# Patient Record
Sex: MALE | Race: BLACK OR AFRICAN AMERICAN | NOT HISPANIC OR LATINO | Employment: FULL TIME | URBAN - METROPOLITAN AREA
[De-identification: names, ages, dates, MRNs, and addresses within clinical notes are randomized per-mention and may not be internally consistent; named-entity substitution may affect disease eponyms.]

---

## 2018-08-25 ENCOUNTER — APPOINTMENT (OUTPATIENT)
Dept: GENERAL RADIOLOGY | Facility: CLINIC | Age: 58
End: 2018-08-25
Attending: EMERGENCY MEDICINE
Payer: OTHER MISCELLANEOUS

## 2018-08-25 ENCOUNTER — HOSPITAL ENCOUNTER (EMERGENCY)
Facility: CLINIC | Age: 58
Discharge: HOME | End: 2018-08-25
Attending: EMERGENCY MEDICINE | Admitting: EMERGENCY MEDICINE
Payer: OTHER MISCELLANEOUS

## 2018-08-25 VITALS
RESPIRATION RATE: 16 BRPM | WEIGHT: 206 LBS | BODY MASS INDEX: 31.32 KG/M2 | OXYGEN SATURATION: 100 % | SYSTOLIC BLOOD PRESSURE: 175 MMHG | DIASTOLIC BLOOD PRESSURE: 105 MMHG | TEMPERATURE: 98 F | HEART RATE: 93 BPM

## 2018-08-25 DIAGNOSIS — S43.402A SPRAIN OF LEFT SHOULDER, UNSPECIFIED SHOULDER SPRAIN TYPE, INITIAL ENCOUNTER: ICD-10-CM

## 2018-08-25 LAB — TROPONIN I BLD-MCNC: 0 UG/L (ref 0–0.1)

## 2018-08-25 PROCEDURE — 84484 ASSAY OF TROPONIN QUANT: CPT

## 2018-08-25 PROCEDURE — 99285 EMERGENCY DEPT VISIT HI MDM: CPT | Mod: 25 | Performed by: EMERGENCY MEDICINE

## 2018-08-25 PROCEDURE — 71046 X-RAY EXAM CHEST 2 VIEWS: CPT

## 2018-08-25 PROCEDURE — 93005 ELECTROCARDIOGRAM TRACING: CPT | Performed by: EMERGENCY MEDICINE

## 2018-08-25 PROCEDURE — 99283 EMERGENCY DEPT VISIT LOW MDM: CPT | Mod: Z6 | Performed by: EMERGENCY MEDICINE

## 2018-08-25 PROCEDURE — 73030 X-RAY EXAM OF SHOULDER: CPT | Mod: LT

## 2018-08-25 NOTE — ED PROVIDER NOTES
[facility] EMERGENCY DEPARTMENT ([facility])     August 25, 2018    History     Chief Complaint   Patient presents with     Shoulder Injury     HPI  Michael Rod is a 58 year old male with history of CAD (s/p stent placement) and HTN who presents to the ED with left shoulder injury.  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  He states that around 11:30 AM this morning he was moving a couch and felt he pulled his muscle in his left shoulder.  He denies hearing or feeling anything snapping.  He states he did not initially have any pain, but throughout the day the pain was gradual in onset.  He states the pain is made worse with movement and has not taken any analgesics yet for his pain.  He states that he can only take aspirin for his pain.  He otherwise denies any other injury.    PAST MEDICAL HISTORY  Past Medical History:   Diagnosis Date     CAD (coronary artery disease)      Hypertension      Tobacco use      PAST SURGICAL HISTORY  Past Surgical History:   Procedure Laterality Date     CARDIAC SURGERY      History of chest pain     ORTHOPEDIC SURGERY  Feb 2012    L hand trigger finger     FAMILY HISTORY  No family history on file.  SOCIAL HISTORY  Social History   Substance Use Topics     Smoking status: Current Every Day Smoker     Smokeless tobacco: Former User     Alcohol use Yes      Comment: occasional     MEDICATIONS  No current facility-administered medications for this encounter.      Current Outpatient Prescriptions   Medication     aspirin EC 81 MG EC tablet     atorvastatin (LIPITOR) 40 MG tablet     metoprolol (LOPRESSOR) 25 MG tablet     nitroglycerin (NITROSTAT) 0.4 MG SL tablet     ALLERGIES  No Known Allergies    I have reviewed the Medications, Allergies, Past Medical and Surgical History, and Social History in the Epic system.    Review of Systems   Musculoskeletal:        Positive for L shoulder pain.    All other systems reviewed and are negative.      Physical Exam   BP:  165/85  Pulse: 68  Temp: 98.2  F (36.8  C)  Resp: 18  Weight: 93.4 kg (206 lb)  SpO2: 97 %      Physical Exam   Constitutional: He is oriented to person, place, and time. No distress.   HENT:   Head: Atraumatic.   Mouth/Throat: Oropharynx is clear and moist. No oropharyngeal exudate.   Eyes: Pupils are equal, round, and reactive to light. No scleral icterus.   Cardiovascular: Normal heart sounds and intact distal pulses.    Pulmonary/Chest: Breath sounds normal. No respiratory distress.   Abdominal: Soft. Bowel sounds are normal. There is no tenderness.   Musculoskeletal: He exhibits tenderness. He exhibits no edema.   L shoulder tender   Neurological: He is alert and oriented to person, place, and time.   Skin: Skin is warm. No rash noted. He is not diaphoretic.   Nursing note and vitals reviewed.      ED Course     ED Course     Procedures   4:18 PM  The patient was seen and examined by Dr. Diane in Room 20.                Critical Care time:  none             Labs Ordered and Resulted from Time of ED Arrival Up to the Time of Departure from the ED - No data to display         Assessments & Plan (with Medical Decision Making)     In summary this is a 58-year-old gentleman with history of CAD with stent in place who comes in with complaint of left shoulder pain while doing some heavy lifting on the job earlier in the day.  His presentations concerning for a musculoskeletal injury to the left shoulder.  Given his cardiac history I went ahead and get an EKG which revealed normal sinus rhythm and no ischemic changes and a point-of-care troponin which is normal x-rays of the left shoulder and chest were also obtained and these revealed no acute process.  Patient to follow-up with employee health within 1-2 days with light duty restrictions until cleared by them or orthopedic's.    I have reviewed the nursing notes.    I have reviewed the findings, diagnosis, plan and need for follow up with the patient.    New  Prescriptions    No medications on file       Final diagnoses:   Sprain of left shoulder, unspecified shoulder sprain type, initial encounter     IArtemio, am serving as a trained medical scribe to document services personally performed by Ulises Diane MD, based on the provider's statements to me.      Ulises MEDRANO MD, was physically present and have reviewed and verified the accuracy of this note documented by Artemio Alvarado.       8/25/2018   [facility], [place], EMERGENCY DEPARTMENT     Ulises Diane MD  08/31/18 0227

## 2018-08-25 NOTE — DISCHARGE INSTRUCTIONS
Please make an appointment to follow up with [facility] (phone: [phone]) in 1-2 days.    Shoulder Sprain  A sprain is a stretching or tearing of the ligaments that hold a joint together. A sprain may take up to 8 weeks to fully heal, depending on how severe it is. Moderate to severe shoulder sprains are treated with a sling or shoulder immobilizer. Minor sprains can be treated without any special support.  Home care  The following guidelines will help you care for your injury at home:    If a sling was given to you, leave it in place for the time advised by your healthcare provider. If you aren t sure how long to wear it, ask for advice. If the sling becomes loose, adjust it so that your forearm is level with the ground. Your shoulder should feel well supported.    Put an ice pack on the injured area for 20 minutes every 1 to 2 hours the first day. You can make your own ice pack by putting ice cubes in a plastic bag. A bag of frozen peas or something similar works well too. Wrap the bag in a thin towel. Continue with ice packs 3 to 4 times a day for the next 2 to 3 days. Then use the pack as needed to ease pain and swelling.    You may use acetaminophen or ibuprofen to control pain, unless another pain medicine was prescribed. If you have chronic liver or kidney disease, talk with your healthcare provider before using these medicines. Also talk with your provider if you ve had a stomach ulcer or gastrointestinal bleeding.    Shoulder joints become stiff if left in a sling for too long. You should start range of motion exercises about 7 to 10 days after the injury. Talk with your provider to find out what type of exercises to do and how soon to start.  Follow-up care  Follow up with your healthcare provider, or as advised.  Any X-rays you had today don t show any broken bones, breaks, or fractures. Sometimes fractures don t show up on the first X-ray. Bruises and sprains can sometimes hurt as  much as a fracture. These injuries can take time to heal completely. If your symptoms don t improve or they get worse, talk with your provider. You may need a repeat X-ray or other treatments.  When to seek medical advice  Call your healthcare provider right away if any of these occur:    Shoulder pain or swelling in your arm that gets worse    Fingers become cold, blue, numb, or tingly    Large amount of bruising of the shoulder or upper arm    Fever or chills  Date Last Reviewed: 8/1/2016 2000-2017 [employer]. [address] [place] [address]. All rights reserved. This information is not intended as a substitute for professional medical care. Always follow your healthcare professional's instructions.

## 2018-08-25 NOTE — ED AVS SNAPSHOT
[facility], Emergency Department    [address]    [place] [address]    Phone:  [phone]    Fax:  [phone]                                       Michael Rod   MRN: [id number]    Department:  [facility], Emergency Department   Date of Visit:  8/25/2018           Patient Information     Date Of Birth          1960        Your diagnoses for this visit were:     Sprain of left shoulder, unspecified shoulder sprain type, initial encounter        You were seen by Ulises Diane MD.        Discharge Instructions         Please make an appointment to follow up with [facility] (phone: [phone]) in 1-2 days.    Shoulder Sprain  A sprain is a stretching or tearing of the ligaments that hold a joint together. A sprain may take up to 8 weeks to fully heal, depending on how severe it is. Moderate to severe shoulder sprains are treated with a sling or shoulder immobilizer. Minor sprains can be treated without any special support.  Home care  The following guidelines will help you care for your injury at home:    If a sling was given to you, leave it in place for the time advised by your healthcare provider. If you aren t sure how long to wear it, ask for advice. If the sling becomes loose, adjust it so that your forearm is level with the ground. Your shoulder should feel well supported.    Put an ice pack on the injured area for 20 minutes every 1 to 2 hours the first day. You can make your own ice pack by putting ice cubes in a plastic bag. A bag of frozen peas or something similar works well too. Wrap the bag in a thin towel. Continue with ice packs 3 to 4 times a day for the next 2 to 3 days. Then use the pack as needed to ease pain and swelling.    You may use acetaminophen or ibuprofen to control pain, unless another pain medicine was prescribed. If you have chronic liver or kidney disease, talk with your healthcare provider before using these medicines. Also talk with  your provider if you ve had a stomach ulcer or gastrointestinal bleeding.    Shoulder joints become stiff if left in a sling for too long. You should start range of motion exercises about 7 to 10 days after the injury. Talk with your provider to find out what type of exercises to do and how soon to start.  Follow-up care  Follow up with your healthcare provider, or as advised.  Any X-rays you had today don t show any broken bones, breaks, or fractures. Sometimes fractures don t show up on the first X-ray. Bruises and sprains can sometimes hurt as much as a fracture. These injuries can take time to heal completely. If your symptoms don t improve or they get worse, talk with your provider. You may need a repeat X-ray or other treatments.  When to seek medical advice  Call your healthcare provider right away if any of these occur:    Shoulder pain or swelling in your arm that gets worse    Fingers become cold, blue, numb, or tingly    Large amount of bruising of the shoulder or upper arm    Fever or chills  Date Last Reviewed: 8/1/2016 2000-2017 [employer]. [address] [place] [address]. All rights reserved. This information is not intended as a substitute for professional medical care. Always follow your healthcare professional's instructions.          24 Hour Appointment Hotline       To make an appointment at any [facility], call [phone] ([phone]). If you don't have a family doctor or clinic, we will help you find one. [facility] are conveniently located to serve the needs of you and your family.          ED Discharge Orders     Follow up with [facility]       You should receive a call from [facility] to schedule your follow up appointment. If you do not hear from them within 24 business hours, call [phone], option 3 for help in scheduling your follow up.  If you are seen in the Emergency Department over the weekend, you will  receive a phone call on the next business day.                     Review of your medicines      Our records show that you are taking the medicines listed below. If these are incorrect, please call your family doctor or clinic.        Dose / Directions Last dose taken    aspirin 81 MG EC tablet   Dose:  81 mg   Quantity:  30 tablet        Take 1 tablet (81 mg) by mouth daily   Refills:  3        atorvastatin 40 MG tablet   Commonly known as:  LIPITOR   Dose:  40 mg   Quantity:  90 tablet        Take 1 tablet (40 mg) by mouth daily   Refills:  3        metoprolol tartrate 25 MG tablet   Commonly known as:  LOPRESSOR   Dose:  25 mg   Quantity:  180 tablet        Take 1 tablet (25 mg) by mouth 2 times daily   Refills:  3        nitroGLYcerin 0.4 MG sublingual tablet   Commonly known as:  NITROSTAT   Dose:  0.4 mg   Quantity:  25 tablet        Place 1 tablet (0.4 mg) under the tongue every 5 minutes as needed for chest pain If you are still having symptoms after 3 doses (15 minutes) call 911.   Refills:  3                Procedures and tests performed during your visit     EKG 12 lead    ISTAT troponin nursing POCT    Troponin POCT    XR Chest 2 Views    XR Shoulder Left G/E 3 Views      Orders Needing Specimen Collection     None      Pending Results     Date and Time Order Name Status Description    8/25/2018 1623 XR Chest 2 Views Preliminary     8/25/2018 1623 EKG 12 lead Preliminary     8/25/2018 1623 XR Shoulder Left G/E 3 Views Preliminary             Pending Culture Results     No orders found from 8/23/2018 to 8/26/2018.            Pending Results Instructions     If you had any lab results that were not finalized at the time of your Discharge, you can call the ED Lab Result RN at [phone]. You will be contacted by this team for any positive Lab results or changes in treatment. The nurses are available 7 days a week from 10A to 6:30P.  You can leave a message 24 hours per day and they will return your  "call.        Thank you for choosing [facility]       Thank you for choosing [facility] for your care. Our goal is always to provide you with excellent care. Hearing back from our patients is one way we can continue to improve our services. Please take a few minutes to complete the written survey that you may receive in the mail after you visit with us. Thank you!        [employer] Information     [employer] lets you send messages to your doctor, view your test results, renew your prescriptions, schedule appointments and more. To sign up, go to [email or web address] . Click on \"Log in\" on the left side of the screen, which will take you to the Welcome page. Then click on \"Sign up Now\" on the right side of the page.     You will be asked to enter the access code listed below, as well as some personal information. Please follow the directions to create your username and password.     Your access code is: [email or web address]  Expires: 2018  5:51 PM     Your access code will  in 90 days. If you need help or a new code, please call your [facility] clinic or [phone].        Care EveryWhere ID     This is your Care EveryWhere ID. This could be used by other organizations to access your [facility] medical records  [id number]        Equal Access to Services     BEATRICE LUX : Hadannalee chowdaryo Soshauna, waaxda luqadaha, qaybta kaalmada adeegyada, cecil munoz. So [facility] [phone].    ATENCIÓN: Si habla español, tiene a alexander disposición servicios gratuitos de asistencia lingüística. Llame al [phone].    We comply with applicable federal civil rights laws and [place] laws. We do not discriminate on the basis of race, color, national origin, age, disability, sex, sexual orientation, or gender identity.            After Visit Summary       This is your record. Keep this with you and show to your community pharmacist(s) and doctor(s) at your next visit.                  "

## 2018-08-25 NOTE — ED AVS SNAPSHOT
[facility], [place], Emergency Department    [address]    [facility] [address]    Phone:  [phone]    Fax:  [phone]                                       Michael Rod   MRN: [id number]    Department:  [facility], Emergency Department   Date of Visit:  8/25/2018           After Visit Summary Signature Page     I have received my discharge instructions, and my questions have been answered. I have discussed any challenges I see with this plan with the nurse or doctor.    ..........................................................................................................................................  Patient/Patient Representative Signature      ..........................................................................................................................................  Patient Representative Print Name and Relationship to Patient    ..................................................               ................................................  Date                                            Time    ..........................................................................................................................................  Reviewed by Signature/Title    ...................................................              ..............................................  Date                                                            Time          22EPIC Rev 08/18

## 2018-08-25 NOTE — LETTER
[facility], [place], EMERGENCY DEPARTMENT  [address]  [facility] [address]  Phone: [phone]  Fax: [phone]    August 25, 2018        Michael Rod  [address] [place] [address]          To whom it may concern:    RE: Michael Rod    Patient may return to work after cleared by Employee Health with the following:  Per Employee Health or Ortho recommendations.     Please contact me for questions or concerns.      Sincerely,        Dr Diane.

## 2018-08-26 LAB — INTERPRETATION ECG - MUSE: NORMAL

## 2018-08-27 ENCOUNTER — TELEPHONE (OUTPATIENT)
Dept: ORTHOPEDICS | Facility: CLINIC | Age: 58
End: 2018-08-27

## 2018-08-27 NOTE — TELEPHONE ENCOUNTER
Call placed to patient to assist in scheduling an appointment with Dr. Ramirez for his left shoulder. He was seen in the ED on 8/25. He was offered an appointment next Wednesday as well as an appointment in [place] this Thursday. Patient declined and requested to schedule at her next available on 9/12. Patient was warm transferred to scheduling to complete setting up this appointment.

## 2018-09-11 ENCOUNTER — PRE VISIT (OUTPATIENT)
Dept: ORTHOPEDICS | Facility: CLINIC | Age: 58
End: 2018-09-11

## 2018-09-11 NOTE — TELEPHONE ENCOUNTER
FUTURE VISIT INFORMATION      FUTURE VISIT INFORMATION:    Date: 9/12    Time: 10:00    Location: [facility]  REFERRAL INFORMATION:    Referring provider:      Referring providers clinic:      Reason for visit/diagnosis  L shoulder pain    RECORDS REQUESTED FROM:       Clinic name Comments Records Status Imaging Status                                         RECORDS STATUS      All records internal

## 2018-09-12 ENCOUNTER — PATIENT OUTREACH (OUTPATIENT)
Dept: CARE COORDINATION | Facility: CLINIC | Age: 58
End: 2018-09-12

## 2023-11-15 ENCOUNTER — HOSPITAL ENCOUNTER (EMERGENCY)
Facility: CLINIC | Age: 63
Discharge: HOME | End: 2023-11-15
Attending: FAMILY MEDICINE | Admitting: FAMILY MEDICINE
Payer: COMMERCIAL

## 2023-11-15 ENCOUNTER — APPOINTMENT (OUTPATIENT)
Dept: CT IMAGING | Facility: CLINIC | Age: 63
End: 2023-11-15
Attending: FAMILY MEDICINE
Payer: COMMERCIAL

## 2023-11-15 ENCOUNTER — APPOINTMENT (OUTPATIENT)
Dept: MRI IMAGING | Facility: CLINIC | Age: 63
End: 2023-11-15
Attending: FAMILY MEDICINE
Payer: COMMERCIAL

## 2023-11-15 VITALS
RESPIRATION RATE: 12 BRPM | BODY MASS INDEX: 29.08 KG/M2 | TEMPERATURE: 97.8 F | WEIGHT: 191.9 LBS | SYSTOLIC BLOOD PRESSURE: 120 MMHG | OXYGEN SATURATION: 99 % | HEIGHT: 68 IN | DIASTOLIC BLOOD PRESSURE: 70 MMHG | HEART RATE: 64 BPM

## 2023-11-15 DIAGNOSIS — R73.9 HYPERGLYCEMIA: ICD-10-CM

## 2023-11-15 DIAGNOSIS — G45.9 TIA (TRANSIENT ISCHEMIC ATTACK): ICD-10-CM

## 2023-11-15 LAB
ALBUMIN SERPL BCG-MCNC: 4 G/DL (ref 3.5–5.2)
ALP SERPL-CCNC: 106 U/L (ref 40–150)
ALT SERPL W P-5'-P-CCNC: 15 U/L (ref 0–70)
ANION GAP SERPL CALCULATED.3IONS-SCNC: 8 MMOL/L (ref 7–15)
AST SERPL W P-5'-P-CCNC: 29 U/L (ref 0–45)
ATRIAL RATE - MUSE: 68 BPM
BASOPHILS # BLD AUTO: 0 10E3/UL (ref 0–0.2)
BASOPHILS NFR BLD AUTO: 0 %
BILIRUB SERPL-MCNC: 0.4 MG/DL
BUN SERPL-MCNC: 15.9 MG/DL (ref 8–23)
CALCIUM SERPL-MCNC: 9.1 MG/DL (ref 8.8–10.2)
CHLORIDE SERPL-SCNC: 98 MMOL/L (ref 98–107)
CHOLEST SERPL-MCNC: 230 MG/DL
CREAT SERPL-MCNC: 1.53 MG/DL (ref 0.67–1.17)
DEPRECATED HCO3 PLAS-SCNC: 26 MMOL/L (ref 22–29)
DIASTOLIC BLOOD PRESSURE - MUSE: NORMAL MMHG
EGFRCR SERPLBLD CKD-EPI 2021: 51 ML/MIN/1.73M2
EOSINOPHIL # BLD AUTO: 0 10E3/UL (ref 0–0.7)
EOSINOPHIL NFR BLD AUTO: 0 %
ERYTHROCYTE [DISTWIDTH] IN BLOOD BY AUTOMATED COUNT: 13 % (ref 10–15)
GLUCOSE BLDC GLUCOMTR-MCNC: 339 MG/DL (ref 70–99)
GLUCOSE SERPL-MCNC: 280 MG/DL (ref 70–99)
HBA1C MFR BLD: 9.2 %
HCT VFR BLD AUTO: 44.7 % (ref 40–53)
HDLC SERPL-MCNC: 34 MG/DL
HGB BLD-MCNC: 15.7 G/DL (ref 13.3–17.7)
IMM GRANULOCYTES # BLD: 0 10E3/UL
IMM GRANULOCYTES NFR BLD: 0 %
INTERPRETATION ECG - MUSE: NORMAL
LDLC SERPL CALC-MCNC: ABNORMAL MG/DL
LYMPHOCYTES # BLD AUTO: 2.5 10E3/UL (ref 0.8–5.3)
LYMPHOCYTES NFR BLD AUTO: 34 %
MAGNESIUM SERPL-MCNC: 1.8 MG/DL (ref 1.7–2.3)
MCH RBC QN AUTO: 33.8 PG (ref 26.5–33)
MCHC RBC AUTO-ENTMCNC: 35.1 G/DL (ref 31.5–36.5)
MCV RBC AUTO: 96 FL (ref 78–100)
MONOCYTES # BLD AUTO: 0.9 10E3/UL (ref 0–1.3)
MONOCYTES NFR BLD AUTO: 13 %
NEUTROPHILS # BLD AUTO: 3.8 10E3/UL (ref 1.6–8.3)
NEUTROPHILS NFR BLD AUTO: 53 %
NONHDLC SERPL-MCNC: 196 MG/DL
NRBC # BLD AUTO: 0 10E3/UL
NRBC BLD AUTO-RTO: 0 /100
NT-PROBNP SERPL-MCNC: 111 PG/ML (ref 0–900)
P AXIS - MUSE: 33 DEGREES
PLATELET # BLD AUTO: 135 10E3/UL (ref 150–450)
POTASSIUM SERPL-SCNC: 4.9 MMOL/L (ref 3.4–5.3)
PR INTERVAL - MUSE: 172 MS
PROT SERPL-MCNC: 7.5 G/DL (ref 6.4–8.3)
QRS DURATION - MUSE: 92 MS
QT - MUSE: 406 MS
QTC - MUSE: 431 MS
R AXIS - MUSE: 38 DEGREES
RBC # BLD AUTO: 4.64 10E6/UL (ref 4.4–5.9)
SODIUM SERPL-SCNC: 132 MMOL/L (ref 135–145)
SYSTOLIC BLOOD PRESSURE - MUSE: NORMAL MMHG
T AXIS - MUSE: -53 DEGREES
TRIGL SERPL-MCNC: 472 MG/DL
TROPONIN T SERPL HS-MCNC: 31 NG/L
TROPONIN T SERPL HS-MCNC: 35 NG/L
VENTRICULAR RATE- MUSE: 68 BPM
WBC # BLD AUTO: 7.4 10E3/UL (ref 4–11)

## 2023-11-15 PROCEDURE — 96361 HYDRATE IV INFUSION ADD-ON: CPT | Performed by: FAMILY MEDICINE

## 2023-11-15 PROCEDURE — 80053 COMPREHEN METABOLIC PANEL: CPT

## 2023-11-15 PROCEDURE — 250N000011 HC RX IP 250 OP 636: Mod: JZ | Performed by: FAMILY MEDICINE

## 2023-11-15 PROCEDURE — 70496 CT ANGIOGRAPHY HEAD: CPT

## 2023-11-15 PROCEDURE — 258N000003 HC RX IP 258 OP 636

## 2023-11-15 PROCEDURE — 83735 ASSAY OF MAGNESIUM: CPT

## 2023-11-15 PROCEDURE — 70450 CT HEAD/BRAIN W/O DYE: CPT

## 2023-11-15 PROCEDURE — 80061 LIPID PANEL: CPT

## 2023-11-15 PROCEDURE — 93010 ELECTROCARDIOGRAM REPORT: CPT | Performed by: FAMILY MEDICINE

## 2023-11-15 PROCEDURE — 99285 EMERGENCY DEPT VISIT HI MDM: CPT | Mod: 25 | Performed by: FAMILY MEDICINE

## 2023-11-15 PROCEDURE — 250N000013 HC RX MED GY IP 250 OP 250 PS 637

## 2023-11-15 PROCEDURE — 250N000009 HC RX 250: Performed by: FAMILY MEDICINE

## 2023-11-15 PROCEDURE — 36415 COLL VENOUS BLD VENIPUNCTURE: CPT | Performed by: FAMILY MEDICINE

## 2023-11-15 PROCEDURE — 85025 COMPLETE CBC W/AUTO DIFF WBC: CPT

## 2023-11-15 PROCEDURE — 70551 MRI BRAIN STEM W/O DYE: CPT

## 2023-11-15 PROCEDURE — 36415 COLL VENOUS BLD VENIPUNCTURE: CPT

## 2023-11-15 PROCEDURE — 84484 ASSAY OF TROPONIN QUANT: CPT | Performed by: FAMILY MEDICINE

## 2023-11-15 PROCEDURE — 83036 HEMOGLOBIN GLYCOSYLATED A1C: CPT

## 2023-11-15 PROCEDURE — 96374 THER/PROPH/DIAG INJ IV PUSH: CPT | Mod: 59 | Performed by: FAMILY MEDICINE

## 2023-11-15 PROCEDURE — 82962 GLUCOSE BLOOD TEST: CPT

## 2023-11-15 PROCEDURE — 83880 ASSAY OF NATRIURETIC PEPTIDE: CPT

## 2023-11-15 PROCEDURE — 70498 CT ANGIOGRAPHY NECK: CPT

## 2023-11-15 PROCEDURE — 99207 PR NO CHARGE LOS: CPT | Performed by: PSYCHIATRY & NEUROLOGY

## 2023-11-15 PROCEDURE — 84484 ASSAY OF TROPONIN QUANT: CPT | Mod: 91

## 2023-11-15 PROCEDURE — 93005 ELECTROCARDIOGRAM TRACING: CPT | Performed by: FAMILY MEDICINE

## 2023-11-15 RX ORDER — ASPIRIN 81 MG/1
81 TABLET, CHEWABLE ORAL ONCE
Status: COMPLETED | OUTPATIENT
Start: 2023-11-15 | End: 2023-11-15

## 2023-11-15 RX ORDER — IOPAMIDOL 755 MG/ML
100 INJECTION, SOLUTION INTRAVASCULAR ONCE
Status: COMPLETED | OUTPATIENT
Start: 2023-11-15 | End: 2023-11-15

## 2023-11-15 RX ORDER — CLOPIDOGREL BISULFATE 75 MG/1
75 TABLET ORAL ONCE
Status: COMPLETED | OUTPATIENT
Start: 2023-11-15 | End: 2023-11-15

## 2023-11-15 RX ORDER — CLOPIDOGREL BISULFATE 75 MG/1
75 TABLET ORAL DAILY
Status: DISCONTINUED | OUTPATIENT
Start: 2023-11-15 | End: 2023-11-15

## 2023-11-15 RX ORDER — CLOPIDOGREL BISULFATE 75 MG/1
75 TABLET ORAL DAILY
Qty: 30 TABLET | Refills: 0 | Status: SHIPPED | OUTPATIENT
Start: 2023-11-15 | End: 2023-12-15

## 2023-11-15 RX ADMIN — CLOPIDOGREL BISULFATE 75 MG: 75 TABLET ORAL at 16:14

## 2023-11-15 RX ADMIN — ASPIRIN 81 MG CHEWABLE TABLET 81 MG: 81 TABLET CHEWABLE at 16:15

## 2023-11-15 RX ADMIN — IOPAMIDOL 67 ML: 755 INJECTION, SOLUTION INTRAVENOUS at 12:27

## 2023-11-15 RX ADMIN — SODIUM CHLORIDE 1000 ML: 9 INJECTION, SOLUTION INTRAVENOUS at 11:19

## 2023-11-15 RX ADMIN — SODIUM CHLORIDE 80 ML: 9 INJECTION, SOLUTION INTRAVENOUS at 12:28

## 2023-11-15 ASSESSMENT — ACTIVITIES OF DAILY LIVING (ADL)
ADLS_ACUITY_SCORE: 35

## 2023-11-15 NOTE — ED PROVIDER NOTES
"ED Provider Note  [facility]      History     Chief Complaint   Patient presents with    Dizziness    Eye Problem     HPI  Michael Rod is a 63 year old male who presents with R blurry vision and hand cramping. He reports the symptoms began 50 minutes before he arrived. He reports the blurry vision lasted for 30 minutes with no loss of vision. The hand cramping persists intermittently and is bilateral. He also reports lightheadedness during the episode. He denies vision loss, muscle weakness, any focal deficits, chest pain, headache and tachycardia. He reports he had been noncompliant with his medications for at least a month.  ABCD2 score of 4, NIH stroke scale of 0. 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     Past Medical History  Past Medical History:   Diagnosis Date    CAD (coronary artery disease)     Hypertension     Tobacco use      Past Surgical History:   Procedure Laterality Date    CARDIAC SURGERY      History of chest pain    ORTHOPEDIC SURGERY  Feb 2012    L hand trigger finger     clopidogrel (PLAVIX) 75 MG tablet  aspirin EC 81 MG EC tablet  atorvastatin (LIPITOR) 40 MG tablet  metoprolol (LOPRESSOR) 25 MG tablet  nitroglycerin (NITROSTAT) 0.4 MG SL tablet      No Known Allergies  Family History  No family history on file.  Social History   Social History     Tobacco Use    Smoking status: Every Day    Smokeless tobacco: Former   Substance Use Topics    Alcohol use: Yes     Comment: occasional    Drug use: No      Past medical history, past surgical history, medications, allergies, family history, and social history were reviewed with the patient. No additional pertinent items.      A complete review of systems was performed with pertinent positives and negatives noted in the HPI, and all other systems negative.    Physical Exam   BP: 92/53  Pulse: 61  Temp: 97.8  F (36.6  C)  Resp: 18  Height: 172.7 cm (5' 8\")  Weight: 87 kg (191 lb 14.4 oz)  SpO2: 97 %  Physical " Exam  HENT:      Head: Normocephalic and atraumatic.   Eyes:      General: No scleral icterus.     Extraocular Movements: Extraocular movements intact.      Right eye: No nystagmus.      Left eye: No nystagmus.      Conjunctiva/sclera: Conjunctivae normal.      Pupils: Pupils are equal, round, and reactive to light.   Cardiovascular:      Rate and Rhythm: Normal rate and regular rhythm.      Heart sounds: Normal heart sounds.   Pulmonary:      Effort: Pulmonary effort is normal.      Breath sounds: Normal breath sounds.   Abdominal:      General: There is no distension.   Musculoskeletal:      Right hand: No swelling. Normal strength. Normal sensation.      Left hand: No swelling. Normal strength. Normal sensation.   Neurological:      General: No focal deficit present.      Mental Status: He is alert and oriented to person, place, and time.      Cranial Nerves: Cranial nerves 2-12 are intact.      Sensory: Sensation is intact.      Motor: Motor function is intact.      Coordination: Coordination is intact.         ED Course, Procedures, & Data     ED Course as of 11/15/23 1621   Wed Nov 15, 2023   1151 Comprehensive metabolic panel               EKG Interpretation:      Clinical Impression: Sinus rhythm with sinus arrhythmia, age undetermined septal infarct, T wave abnormality         Results for orders placed or performed during the hospital encounter of 11/15/23   CT Head w/o Contrast     Status: None    Narrative    EXAM: CT HEAD W/O CONTRAST  11/15/2023 12:48 PM     HISTORY: Transient vision loss       COMPARISON: None    TECHNIQUE: Using multidetector thin collimation helical acquisition  technique, axial, coronal and sagittal CT images from the skull base  to the vertex were obtained without intravenous contrast.   (topogram) image(s) also obtained and reviewed.    Radiation dose for this scan was reduced using automated exposure  control, adjustment of the mA and/or kV according to patient size,  or  iterative reconstruction technique.    FINDINGS:    Calvarium/skull base: No acute calvarial fracture. No destructive  osseous lesions. Mastoids and middle ears are clear.    Orbits: Imaged portions are within normal limits.    Paranasal sinuses: Small amount of secretions layering in the right  maxillary sinus.    Brain: No acute intracranial hemorrhage. No evidence of acute large  vascular territory ischemic infarct. No mass effect. No hydrocephalus.  Basal cisterns are patent. Calcified intracranial atherosclerosis.  Changes suggestive of mild chronic microvascular ischemic disease.  Small remote appearing lacunar infarcts in the left basal ganglia  (series 3/images 17-19) and involving the right caudate body (series  3/image 20).      Impression    IMPRESSION:    1.  No CT evidence of acute intracranial abnormality.  2.  Small remote appearing lacunar infarcts involving the left basal  ganglia and right caudate body. MRI could be considered for further  evaluation if there is high clinical suspicion for acute infarct.  3.  Calcified intracranial atherosclerosis with changes suggestive of  mild chronic microvascular ischemic disease.  4.  Small amount secretions layering in the right maxillary sinus.  Correlate with any evidence of acute sinusitis.    JENNY DALY MD         SYSTEM ID:  [id number]   CTA Head Neck with Contrast     Status: None    Narrative    EXAM: CTA HEAD NECK W CONTRAST  11/15/2023 12:49 PM     HISTORY: Transient vision loss       COMPARISON: None.    TECHNIQUE:  HEAD and NECK CTA: During rapid bolus intravenous injection of  nonionic contrast material, axial images were obtained using thin  collimation multidetector helical technique from the base of the upper  aortic arch through the [place]. This CT angiogram data was  reconstructed at thin intervals with mild overlap. Images were sent to  the 3D workstation, and 3D reconstructions were obtained. The axial  source images,  multiplanar reformations, 3D reconstructions in both  maximum intensity projection display and volume rendered models were  reviewed, with reconstructions performed by the technologist.    Radiation dose for this scan was reduced using automated exposure  control, adjustment of the mA and/or kV according to patient size, or  iterative reconstruction technique    CONTRAST: 67mL Isovue 370    FINDINGS:    NECK CTA:    Aortic arch: Normal configuration of the aortic arch. No high-grade  stenosis at the origins of the major branch vessels.    Left carotid artery: Calcified atherosclerotic plaque involving the  common carotid artery without substantial focal luminal narrowing.  Mixed atherosclerotic plaque about the carotid bifurcation without  substantial focal luminal narrowing (50% or greater). No evidence of  dissection.    Right carotid artery: Mild soft plaque involving the common carotid  artery without substantial luminal narrowing. Mixed atherosclerotic  plaque about the carotid bifurcation without substantial (50% or  greater) luminal narrowing. No evidence of dissection.    Vertebral arteries: Left dominant. No high-grade stenosis or  occlusion. A few areas of mild to moderate luminal narrowing are seen  involving the bilateral vertebral artery V2 segments secondary to  extrinsic compression from adjacent cervical spine degenerative  changes. No evidence of dissection.    HEAD CTA:      Anterior circulation: Mixed atherosclerotic plaque involving the  carotid siphons with areas of moderate luminal narrowing bilaterally.  Otherwise, no high-grade stenosis or occlusion involving the major  arteries of the anterior circulation. No aneurysm. Conventional [place] anatomy.    Posterior circulation: No high-grade stenosis or occlusion. No  aneurysm.    Venous structures: Grossly patent.     Additional findings: Multilevel cervical spondylosis.      Impression    IMPRESSION:    HEAD CTA:  1.  No high-grade  stenosis or occlusion involving the major  intracranial arteries.  2.  Mixed atherosclerotic plaque involving the carotid siphons with  areas of moderate luminal narrowing bilaterally.  3.  No aneurysm.    NECK CTA:  1.  No high-grade stenosis or occlusion involving the major arteries  of the neck.  2.  Scattered atherosclerotic disease involving the bilateral cervical  carotid arteries without substantial (50% or greater) luminal  narrowing.  3.  No evidence of dissection.    JENNY DALY MD         SYSTEM ID:  [id number]   MR Brain w/o Contrast     Status: None    Narrative    EXAM: MR BRAIN W/O CONTRAST  11/15/2023 3:33 PM     HISTORY: Right eye visual disturbance, TIA, CTA shows vascular disease  but no acute stroke       COMPARISON: Same day head CT.    TECHNIQUE: Multiplanar, multisequence MR imaging of the head without  intravenous contrast    FINDINGS:    Calvarium/skull base: No focal marrow replacing lesion.    Orbits: Within normal limits accounting for technique.    Paranasal sinuses: Similar small amount of debris layering in the  right maxillary sinus.     Brain: No restricted diffusion. Small remote lacunar infarcts in the  left basal ganglia and adjacent to the right caudate head. Background  changes suggestive of mild chronic microvascular ischemic disease. No  evidence of acute intracranial hemorrhage. No hydrocephalus. Basal  cisterns are patent. Normal positioning and morphology of the  cerebellar tonsils. No substantial white matter disease for patient's  age. Normal flow related signal within the major intracranial arteries  and venous structures.      Impression    IMPRESSION:    1.  No acute intracranial abnormality. Specifically, no acute infarct.  2.  Small remote lacunar infarcts in the left basal ganglia and  adjacent to the right caudate head superimposed on a background of  mild chronic microvascular ischemic disease.    JENNY DALY MD         SYSTEM ID:  [id number]   Glucose by  meter     Status: Abnormal   Result Value Ref Range    GLUCOSE BY METER POCT 339 (H) 70 - 99 mg/dL   Hemoglobin A1c     Status: Abnormal   Result Value Ref Range    Hemoglobin A1C 9.2 (H) <5.7 %   Lipid Profile     Status: Abnormal   Result Value Ref Range    Cholesterol 230 (H) <200 mg/dL    Triglycerides 472 (H) <150 mg/dL    Direct Measure HDL 34 (L) >=40 mg/dL    LDL Cholesterol Calculated      Non HDL Cholesterol 196 (H) <130 mg/dL    Narrative    Cholesterol  Desirable:  <200 mg/dL    Triglycerides  Normal:  Less than 150 mg/dL  Borderline High:  150-199 mg/dL  High:  200-499 mg/dL  Very High:  Greater than or equal to 500 mg/dL    Direct Measure HDL  Female:  Greater than or equal to 50 mg/dL   Male:  Greater than or equal to 40 mg/dL    LDL Cholesterol  Desirable:  <100mg/dL  Above Desirable:  100-129 mg/dL   Borderline High:  130-159 mg/dL   High:  160-189 mg/dL   Very High:  >= 190 mg/dL    Non HDL Cholesterol  Desirable:  130 mg/dL  Above Desirable:  130-159 mg/dL  Borderline High:  160-189 mg/dL  High:  190-219 mg/dL  Very High:  Greater than or equal to 220 mg/dL   Troponin T, High Sensitivity     Status: Abnormal   Result Value Ref Range    Troponin T, High Sensitivity 35 (H) <=22 ng/L   Nt probnp inpatient (BNP)     Status: Normal   Result Value Ref Range    N terminal Pro BNP Inpatient 111 0 - 900 pg/mL   Comprehensive metabolic panel     Status: Abnormal   Result Value Ref Range    Sodium 132 (L) 135 - 145 mmol/L    Potassium 4.9 3.4 - 5.3 mmol/L    Carbon Dioxide (CO2) 26 22 - 29 mmol/L    Anion Gap 8 7 - 15 mmol/L    Urea Nitrogen 15.9 8.0 - 23.0 mg/dL    Creatinine 1.53 (H) 0.67 - 1.17 mg/dL    GFR Estimate 51 (L) >60 mL/min/1.73m2    Calcium 9.1 8.8 - 10.2 mg/dL    Chloride 98 98 - 107 mmol/L    Glucose 280 (H) 70 - 99 mg/dL    Alkaline Phosphatase 106 40 - 150 U/L    AST 29 0 - 45 U/L    ALT 15 0 - 70 U/L    Protein Total 7.5 6.4 - 8.3 g/dL    Albumin 4.0 3.5 - 5.2 g/dL    Bilirubin Total 0.4  <=1.2 mg/dL   Magnesium     Status: Normal   Result Value Ref Range    Magnesium 1.8 1.7 - 2.3 mg/dL   CBC with platelets and differential     Status: Abnormal   Result Value Ref Range    WBC Count 7.4 4.0 - 11.0 10e3/uL    RBC Count 4.64 4.40 - 5.90 10e6/uL    Hemoglobin 15.7 13.3 - 17.7 g/dL    Hematocrit 44.7 40.0 - 53.0 %    MCV 96 78 - 100 fL    MCH 33.8 (H) 26.5 - 33.0 pg    MCHC 35.1 31.5 - 36.5 g/dL    RDW 13.0 10.0 - 15.0 %    Platelet Count 135 (L) 150 - 450 10e3/uL    % Neutrophils 53 %    % Lymphocytes 34 %    % Monocytes 13 %    % Eosinophils 0 %    % Basophils 0 %    % Immature Granulocytes 0 %    NRBCs per 100 WBC 0 <1 /100    Absolute Neutrophils 3.8 1.6 - 8.3 10e3/uL    Absolute Lymphocytes 2.5 0.8 - 5.3 10e3/uL    Absolute Monocytes 0.9 0.0 - 1.3 10e3/uL    Absolute Eosinophils 0.0 0.0 - 0.7 10e3/uL    Absolute Basophils 0.0 0.0 - 0.2 10e3/uL    Absolute Immature Granulocytes 0.0 <=0.4 10e3/uL    Absolute NRBCs 0.0 10e3/uL   Troponin T, High Sensitivity     Status: Abnormal   Result Value Ref Range    Troponin T, High Sensitivity 31 (H) <=22 ng/L   EKG 12-lead, complete     Status: None   Result Value Ref Range    Systolic Blood Pressure  mmHg    Diastolic Blood Pressure  mmHg    Ventricular Rate 68 BPM    Atrial Rate 68 BPM    ID Interval 172 ms    QRS Duration 92 ms     ms    QTc 431 ms    P Axis 33 degrees    R AXIS 38 degrees    T Axis -53 degrees    Interpretation ECG       Sinus rhythm with sinus arrhythmia  Septal infarct , age undetermined  T wave abnormality, consider inferolateral ischemia  Abnormal ECG  Unconfirmed report - interpretation of this ECG is computer generated - see medical record for final interpretation    Confirmed by - EMERGENCY ROOM, PHYSICIAN ([id number]),  RAMIRO JADE ([id number]) on 11/15/2023 12:41:05 PM     CBC with Platelets & Differential     Status: Abnormal    Narrative    The following orders were created for panel order CBC with Platelets &  Differential.  Procedure                               Abnormality         Status                     ---------                               -----------         ------                     CBC with platelets and d...[[id number]]  Abnormal            Final result                 Please view results for these tests on the individual orders.     Medications   sodium chloride 0.9% BOLUS 1,000 mL (0 mLs Intravenous Stopped 11/15/23 1220)   iopamidol (ISOVUE-370) solution 100 mL (67 mLs Intravenous $Given 11/15/23 1227)   sodium chloride 0.9 % bag 100mL (80 mLs Intravenous $Given 11/15/23 1228)   aspirin (ASA) chewable tablet 81 mg (81 mg Oral $Given 11/15/23 1615)   clopidogrel (PLAVIX) tablet 75 mg (75 mg Oral $Given 11/15/23 1614)     Labs Ordered and Resulted from Time of ED Arrival to Time of ED Departure   GLUCOSE BY METER - Abnormal       Result Value    GLUCOSE BY METER POCT 339 (*)    HEMOGLOBIN A1C - Abnormal    Hemoglobin A1C 9.2 (*)    LIPID PROFILE - Abnormal    Cholesterol 230 (*)     Triglycerides 472 (*)     Direct Measure HDL 34 (*)     LDL Cholesterol Calculated        Non HDL Cholesterol 196 (*)    TROPONIN T, HIGH SENSITIVITY - Abnormal    Troponin T, High Sensitivity 35 (*)    COMPREHENSIVE METABOLIC PANEL - Abnormal    Sodium 132 (*)     Potassium 4.9      Carbon Dioxide (CO2) 26      Anion Gap 8      Urea Nitrogen 15.9      Creatinine 1.53 (*)     GFR Estimate 51 (*)     Calcium 9.1      Chloride 98      Glucose 280 (*)     Alkaline Phosphatase 106      AST 29      ALT 15      Protein Total 7.5      Albumin 4.0      Bilirubin Total 0.4     CBC WITH PLATELETS AND DIFFERENTIAL - Abnormal    WBC Count 7.4      RBC Count 4.64      Hemoglobin 15.7      Hematocrit 44.7      MCV 96      MCH 33.8 (*)     MCHC 35.1      RDW 13.0      Platelet Count 135 (*)     % Neutrophils 53      % Lymphocytes 34      % Monocytes 13      % Eosinophils 0      % Basophils 0      % Immature Granulocytes 0      NRBCs per 100  WBC 0      Absolute Neutrophils 3.8      Absolute Lymphocytes 2.5      Absolute Monocytes 0.9      Absolute Eosinophils 0.0      Absolute Basophils 0.0      Absolute Immature Granulocytes 0.0      Absolute NRBCs 0.0     TROPONIN T, HIGH SENSITIVITY - Abnormal    Troponin T, High Sensitivity 31 (*)    NT PROBNP INPATIENT - Normal    N terminal Pro BNP Inpatient 111     MAGNESIUM - Normal    Magnesium 1.8     GLUCOSE MONITOR NURSING POCT     MR Brain w/o Contrast   Final Result   IMPRESSION:      1.  No acute intracranial abnormality. Specifically, no acute infarct.   2.  Small remote lacunar infarcts in the left basal ganglia and   adjacent to the right caudate head superimposed on a background of   mild chronic microvascular ischemic disease.      JENNY DALY MD            SYSTEM ID:  [id number]      CTA Head Neck with Contrast   Final Result   IMPRESSION:      HEAD CTA:   1.  No high-grade stenosis or occlusion involving the major   intracranial arteries.   2.  Mixed atherosclerotic plaque involving the carotid siphons with   areas of moderate luminal narrowing bilaterally.   3.  No aneurysm.      NECK CTA:   1.  No high-grade stenosis or occlusion involving the major arteries   of the neck.   2.  Scattered atherosclerotic disease involving the bilateral cervical   carotid arteries without substantial (50% or greater) luminal   narrowing.   3.  No evidence of dissection.      JENNY DALY MD            SYSTEM ID:  [id number]      CT Head w/o Contrast   Final Result   IMPRESSION:      1.  No CT evidence of acute intracranial abnormality.   2.  Small remote appearing lacunar infarcts involving the left basal   ganglia and right caudate body. MRI could be considered for further   evaluation if there is high clinical suspicion for acute infarct.   3.  Calcified intracranial atherosclerosis with changes suggestive of   mild chronic microvascular ischemic disease.   4.  Small amount secretions layering in the  right maxillary sinus.   Correlate with any evidence of acute sinusitis.      JENNY DALY MD            SYSTEM ID:  [id number]               Assessment & Plan    Michael Rod is a 63 year old male who presents with R blurry vision with no vision loss and bilateral hand cramping lasting 50 minutes. History is most suggestive of a transient ischemic attack. On exam, no focal deficits were appreciated. NIH stroke scale was 0, ABCD2 score of 3. CT brain significant for small remote appearing lacunar infarcts involving the left basal ganglia and right caudate body, calcified intracranial atherosclerosis suggestive of mild chronic microvascular ischemic disease and no evidence of acute intracranial abnormality. CTA head and neck significant for scattered atherosclerotic disease without 50% or greater luminal narrowing. MRI brain significant for small remote lacunar infarcts in the left basal ganglia and adjacent to the right caudate head superimposed on a background of mild chronic microvascular ischemic disease with no evidence of acute infarct. Given his moderate risk of stroke and elevated ABCD2 score patient was given a 1x dose of aspirin and plavix. Patient was medically stable and safe to discharge with 30day supply of plavix and instructed to restart his medication regimen and follow up with his primary provider.     I have reviewed the nursing notes. I have reviewed the findings, diagnosis, plan and need for follow up with the patient.    New Prescriptions    CLOPIDOGREL (PLAVIX) 75 MG TABLET    Take 1 tablet (75 mg) by mouth daily for 30 days       Final diagnoses:   TIA (transient ischemic attack)   Hyperglycemia       Judie Burgess DO  --    ED Attending Physician Attestation    I Reinaldo Montiel MD, cared for this patient with the Resident. I have performed a history and physical examination of the patient and discussed management with the resident. I reviewed the resident's documentation above and  agree with the documented findings and plan of care.    Summary of HPI, PE, ED Course   Patient is a 63 year old male evaluated in the emergency department for transient approximately 15 to 30-minute episode of blurred vision in the right eye, also reporting bilateral musculoskeletal cramping of the hands.  Reported recent noncompliance with diabetic medications and associated hyperglycemia polyuria and polydipsia.  On presentation all of his symptoms had resolved.  His symptoms were not associated with any headache, chest pain, fever, chills, stiff neck or other neurologic symptoms. Exam and ED course notable for normal vital signs, normal mental status, unremarkable eye exam including extraocular movements and visual acuity, normal cardiovascular exam, normal neurologic exam with NIH score 0.  Initial differential diagnosis of his symptoms includes TIA, acute CVA (ischemic, embolic, hemorrhagic) migraine headache, seizure disorder, hypoglycemia (or hyperglycemia), peripheral neuropathy, CNS infection, Toxin ingestion, shock state (e.g. sepsis).  His imaging results are documented above in the resident's portion of the note and in epic.  We consulted the neuro stroke team early in the patient's course, they also reviewed the imaging, we discussed the case with them at length.  It is noted to have some nonspecific T wave findings on his EKG however denies any chest symptoms, and has negative serial troponin.  I do not think further work-up of cardiac issues is indicated today.  Based on the patient's clinical course, his ABCD 2 score, his imaging, or consultation with the neuro stroke team, it was agreed that the patient would be treated with double antiplatelet therapy (aspirin and Plavix), and will follow-up closely with his outpatient providers and neurology.We discussed the indications for emergency department return and follow-up.  Stable for discharge.    after the completion of care in the emergency  department, the patient was discharged.    Critical Care & Procedures  Not applicable.        Medical Decision Making  The patient's presentation was of high complexity (an acute health issue posing potential threat to life or bodily function).    The patient's evaluation involved:  review of external note(s) from 1 sources (reviewed prior outpatient clinic notes with respect to his previous cardiac condition and diabetes)  review of 3+ test result(s) ordered prior to this encounter (reviewed prior EKG, and multiple prior labs for comparison to today)  ordering and/or review of 3+ test(s) in this encounter (see separate area of note for details)  independent interpretation of testing performed by another health professional (CT head, CTA head and neck, MRI brain, images personally reviewed, also discussed interpretation with neuro stroke team and reviewed radiologist report)  discussion of management or test interpretation with another health professional (Case discussed with neuro stroke service)    The patient's management necessitated moderate risk (prescription drug management including medications given in the ED) and high risk (a decision regarding hospitalization).          Reinaldo Montiel MD  Emergency Medicine     [facility] EMERGENCY DEPARTMENT  11/15/2023     Reinaldo Montiel MD  11/16/23 [id number]

## 2023-11-15 NOTE — CONSULTS
"[facility]    Stroke Telephone Note    I was called by Reinaldo Montiel on 11/15/23 regarding patient Michael Rod. The patient is a 63 year old male presenting with acute onset of RIGHT eye vision loss. History of diabetes that hasn't been well controlled as of late.    Vitals  BP: 92/53   Pulse: 61   Resp: 18   Temp: 97.8  F (36.6  C)   Weight: 87 kg (191 lb 14.4 oz)    Imaging Findings  CT head: No abnormalities  CTA head/neck: No critical stenosis or occlusions    Impression  Transient ischemic attack  Transient eye issues with resolution. Concerning for transient vascular event    Recommendations  - Daily aspirin 325 mg for secondary stroke prevention (increase from daily 81mg)  - Plavix (clopidogrel) 300 mg PO loading dose x 1  - Plavix (clopidogrel) 75 mg PO Daily  - Statin: continue home statin  - MRI Brain without contrast  - A1c, Get direct LDL  - Call after MRI complete for determination of plan moving forward.    My recommendations are based on the information provided over the phone by Michael SANABRIA Marcel's in-person providers. They are not intended to replace the clinical judgment of his in-person providers. I was not requested to personally see or examine the patient at this time.    My recommendations are based on the information provided over the phone by Michael Rod's in-person providers. They are not intended to replace the clinical judgment of his in-person providers. I was not requested to personally see or examine the patient at this time.    Clarence Soriano MD  Vascular Neurology/Neurocritical Care    To page me or covering stroke neurology team member, click here: [email or web address]  Choose \"On Call\" tab at top, then select \"NEUROLOGY/ALL SITES\" from middle drop-down box, press Enter, then look for \"stroke\" or \"telestroke\" for your site.         "

## 2023-11-15 NOTE — DISCHARGE INSTRUCTIONS
Please continue to take your home medications as prescribed and follow up with your primary care provider to schedule a transthoracic echocardiogram as well as a neurology consult. A prescription for Plavix has been sent to your pharmacy. Please add this to your medication regimen until you are able to see your primary care doctor to make any necessary adjustments. Return to the ER if you have any worsening symptoms or signs of a stroke.

## 2023-11-15 NOTE — ED TRIAGE NOTES
Patient states hands started cramping up this morning, got to a room to work and started having blurred vision and dizziness. Patient reports [id number], has been taking oral meds as prescribed. [id number]

## 2023-11-15 NOTE — LETTER
November 15, 2023      To Whom It May Concern:      Michael Rod was seen in our Emergency Department today, 11/15/23.  I expect his condition to improve over the next 3 days.  He may return to work/school when improved.    Sincerely,    Judie Burgess, DO